# Patient Record
Sex: MALE | NOT HISPANIC OR LATINO
[De-identification: names, ages, dates, MRNs, and addresses within clinical notes are randomized per-mention and may not be internally consistent; named-entity substitution may affect disease eponyms.]

---

## 2019-01-10 ENCOUNTER — APPOINTMENT (OUTPATIENT)
Dept: ORTHOPEDIC SURGERY | Facility: CLINIC | Age: 59
End: 2019-01-10
Payer: COMMERCIAL

## 2019-01-10 VITALS
HEIGHT: 75 IN | HEART RATE: 70 BPM | SYSTOLIC BLOOD PRESSURE: 161 MMHG | BODY MASS INDEX: 31.71 KG/M2 | WEIGHT: 255 LBS | DIASTOLIC BLOOD PRESSURE: 99 MMHG

## 2019-01-10 DIAGNOSIS — Z82.61 FAMILY HISTORY OF ARTHRITIS: ICD-10-CM

## 2019-01-10 DIAGNOSIS — Z78.9 OTHER SPECIFIED HEALTH STATUS: ICD-10-CM

## 2019-01-10 DIAGNOSIS — M67.441 GANGLION, RIGHT HAND: ICD-10-CM

## 2019-01-10 DIAGNOSIS — M19.041 PRIMARY OSTEOARTHRITIS, RIGHT HAND: ICD-10-CM

## 2019-01-10 PROCEDURE — 73130 X-RAY EXAM OF HAND: CPT | Mod: RT

## 2019-01-10 PROCEDURE — 99203 OFFICE O/P NEW LOW 30 MIN: CPT

## 2019-01-10 NOTE — PHYSICAL EXAM
[de-identified] : Right hand:\par Heberden's nodes index, long, ring, little fingers.No tenderness of DIP joints.\par long finger: Thickening ulnar aspect betw nail/DIP crease. Suggestion of mucous cyst, longitudinally/obliquely oriented approx 4mm x 8 mm. full thickness skin overlying. NT\par Index: minimal thickening dorsal midline between eponychial fold and DIP crease. Nontender. No clear-cut palpable mass or mucous cyst.\par No other obvious mucous cysts.\par Otherwise, no pertinent basal thumb MP, IP joint findings.\par No A1 pulley tenderness and no triggering in any finger.\par \par Left hand\par No evidence of mucous cysts\par Little finger Heberden's node. PIP 5, flexion contracture, 15°. No pain associated. Good flexion.\par Otherwise, No pertinent MP, PIP, or DIP joint contributory findings.\par \par Neurologic: Median, ulnar, and radial motor and sensory are intact. \par Skin: No cyanosis, clubbing, or rashes.\par Vascular: Radial pulses intact.\par Lymphatic: No streaking or epitrochlear adenopathy.\par The patient is awake, alert, and oriented. Affect appropriate. Cooperative.  [de-identified] : Radiographs ordered and interpreted by me today, reviewed and discussed with the patient today.\par 3 views, right hand, including the fingers\par Osteophyte formation dorsal distal aspect of shaft at condyle as seen on lateral radiograph. Index: Volar condyle osteophyte. Long finger streaky calcification of volar and proximal to condyle.\par Ring DIP joint dorsal osteophyte at condyle margin. Small calcification volarly. Little finger dorsal ridge osteophyte distal, middle phalanx at the IP joint. Very small osteophyte extensor tubercle, distal phalanx. Soft tissue calcification dorsally. PA. Radiographs show subtle marginal, radiodensity, consistent with calcification DIP, 2, for, 5. Wrist, basal joint, MP and PIP joints without notable change except for possible slight narrowing of STT joint. No fractures or dislocations.

## 2019-01-10 NOTE — DISCUSSION/SUMMARY
[FreeTextEntry1] : The patient has mild osteoarthritis, DIP, 2, 3, 4, 5. There is a small mucous cyst, obliquely oriented, long finger along side fingernail approximately 2 x 8 mm. Skin is full thickness overlying. There is no pain associated. The mass does not require treatment. If it becomes larger, painful, developed translucent overlying skin, or drains, then surgery would be a consideration.\par \par The patient describes mucous cyst over the dorsum of the index finger, but it is not palpable today.\par No other mucous cysts appreciated bilaterally.\par Other than DIP arthritis and possibly very early left basal joint arthritis, no other notable hand problems requiring treatment.\par The possible need for surgery for long finger mucous cyst has been discussed. Surgery is not indicated at this time.\par  A lengthy and detailed discussion was held with the patient regarding analysis, treatment, and recommendations. All questions have been answered. At the conclusion the patient expressed acceptance and understanding.

## 2019-01-10 NOTE — HISTORY OF PRESENT ILLNESS
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  It is not painful.  It does not fluctuate in size.  It does not interfere with ROM.  It has not drained.  He was seen by a dermatologist Dr. Pizarro who told him to follow up here.\par He also notes a mass on his right index finger.  He had manually compressed "the cyst" by compressing eponychial region with another finger multiple times on his own. There has always been a full-thickness skin overlying. The patient did not perforate the skin with a needle. The dermatologist did not aspirate this cyst. No fluid has been expressed through the skin. The patient believes the cyst was flattened by this maneuver. It is currently very small and it is not bothering him. \par Denies numbness or tingling.  Denies triggering of any fingers.\par He notes a history of a fractured left little finger which was treated conservatively.   \par Patient has no other notable hand complaints.

## 2022-10-06 ENCOUNTER — APPOINTMENT (OUTPATIENT)
Dept: ORTHOPEDIC SURGERY | Facility: CLINIC | Age: 62
End: 2022-10-06

## 2022-10-06 DIAGNOSIS — Z00.00 ENCOUNTER FOR GENERAL ADULT MEDICAL EXAMINATION W/OUT ABNORMAL FINDINGS: ICD-10-CM

## 2022-10-06 DIAGNOSIS — M75.31 CALCIFIC TENDINITIS OF RIGHT SHOULDER: ICD-10-CM

## 2022-10-06 PROCEDURE — 73030 X-RAY EXAM OF SHOULDER: CPT | Mod: RT

## 2022-10-06 PROCEDURE — 99214 OFFICE O/P EST MOD 30 MIN: CPT

## 2022-10-06 PROCEDURE — 99204 OFFICE O/P NEW MOD 45 MIN: CPT

## 2022-10-06 RX ORDER — METHYLPREDNISOLONE 4 MG/1
4 TABLET ORAL
Qty: 1 | Refills: 0 | Status: ACTIVE | COMMUNITY
Start: 2022-10-06 | End: 1900-01-01

## 2022-10-12 NOTE — IMAGING
[de-identified] : The patient is a well appearing 62 year year old male of their stated age.\par Neck is supple & nontender to palpation. Negative Spurling's test.\par \par General: in no acute distress, seated comfortably, moving easily\par Skin: No discoloration, rashes; on palpation skin is dry, \par Neuro: Normal sensation all dermatomes, motor all myotomes\par Vascular: Normal pulses, no edema, normal temperature\par Coordination and balance: Normal\par Psych: normal mood and affect, non pressured speech, alert and oriented x3\par \par Effected Shoulder \par Inspection:\par Scapula Winging: Negative\par Deformity: None\par Erythema: None\par Ecchymosis: None\par Abrasions: None\par Effusion: None\par \par Range of Motion:\par Active Forward Flexion: 160 degrees \par Passive Forward Flexion: 170 degrees \par Active IR : L4\par Passive ER : 30 degrees\par \par Motor Exam:\par Forward Flexion: 4+ out of 5\par Flexion Plane of Scapula: 5 out of 5\par Abduction: 4+ out of 5\par Internal Rotation: 5 out of 5\par External Rotation: 4+ out of 5\par Distal Motor Strength: 5 out of 5\par \par Stability Testing:\par Anterior: 1+\par Posterior: 1+\par Sulcus N: 1+\par Sulcus ER: 1+\par \par Provocative Tests:\par Drop Arm: Negative\par Lawson/Impingement: Positive\par [place]: Positive\par X-Arm Adduction: Negative\par Belly Press: Negative\par Bear Hug: Negative\par Lift Off: Negative\par Apprehension: Negative\par Relocation: Negative\par Posterior Load & Shift: Negative\par \par Palpation:\par AC Joint: Nontender\par Clavicle: Nontender\par SC Joint: Nontender\par Bicepital Groove: Positive\par Coracoid Process: Nontender\par Pectoralis Minor Tendon: Nontender\par Pectoralis Major Tendon: Nontender & palpably intact\par Latissimus Dorsi: Nontender \par Proximal Humerus: Positive\par Scapula Body: Nontender\par Medial Scapula Boarder: Nontender\par Scapula Spine: Nontender\par \par Neurologic Exam: Sensation to Light Touch:\par Axillary: Grossly intact\par Ulnar: Grossly intact\par Radial: Grossly intact\par Median: Grossly intact\par Other:  N/A\par \par Circulatory/Pulses:\par Ulnar: 2+\par Radial: 2+\par Other Pertinent Findings: None\par \par Contralateral Shoulder\par Range of Motion:\par Active Forward Flexion: 180 degrees \par Active Abduction: 180 degrees \par Passive Forward Flexion: 180 degrees \par Passive Abduction: 180 degrees \par ER @ 90 degrees: 90 degrees\par IR @ 90 degrees: 45 degrees\par ER @ 0 degrees: 50 degrees\par \par Motor Exam:\par Forward Flexion: 5 out of 5\par Flexion Plane of Scapula: 5 out of 5\par Abduction: 5 out of 5\par Internal Rotation: 5 out of 5\par External Rotation: 5 out of 5\par Distal Motor Strength: 5 out of 5\par \par Stability Testing:\par Anterior: 1+\par Posterior: 1+\par Sulcus N: 1+\par Sulcus ER: 1+\par \par Other Pertinent Findings: None\par   [Right] : right shoulder [There are no fractures, subluxations or dislocations. No significant abnormalities are seen] : There are no fractures, subluxations or dislocations. No significant abnormalities are seen [Type 2 acromion] : Type 2 acromion [Calcific density] : Calcific density

## 2022-10-12 NOTE — HISTORY OF PRESENT ILLNESS
[de-identified] : The patient is a 61 year old RT hand dominant M who presents today complaining of right shoulder pain\par Date of Injury/Onset: 2021 \par Pain:    At Rest: 4/10 \par With Activity:  4/10 \par Mechanism of injury: No known cause of injury/ trauma \par This is [not] a Work Related Injury being treated under Worker's Compensation.\par This is [not] an athletic injury occurring associated with an interscholastic or organized sports team.\par Quality of symptoms: Aching \par Improves with: Motrin \par Worse with: Lifting \par Treatment/ Imaging/ Studies sine last visit: PT 3/222\par              Reports Available For Review Today:\par Out of work/sport: [Yes], since [date of injury]\par School/Sport/Position/Occupation:  \par Additional Information: [None]\par  \par 10/06/2022: Patient is here today for a followup visit for R shoulder pain, previously seen, has done PT, no h/o CSI. Pain started in 2021, no specific trauma/injury, denies n/t to RT hand. Has taken ibuprofen with mild relief.

## 2022-10-12 NOTE — DISCUSSION/SUMMARY
[de-identified] : Assessment: The patient is a 61 year old male with RT shoulder pain and physical exam findings consistent with calcific tendonitis.\par \par Patient and I discussed their symptoms. Discussed findings of today's exam and possible causes of patient's pain. Educated patient on their most probable diagnosis. Reviewed possible courses of treatment, and we collaboratively decided best course of treatment at this time will include:\par \par 1. MRI RT shoulder\par 2. Start MDP \par 3. Consider CSI RT shoulder if pain persists or worsens\par 4. HEP\par \par The patient's current medication management of their orthopedic diagnosis was reviewed today: \par \par (1) We discussed a comprehensive treatment plan that included possible pharmaceutical management involving the use of prescription strength medications including but not limited to options such as oral Naprosyn 500mg BID, once daily Meloxicam 15 mg, or 500-650 mg Tylenol versus over the counter oral medications and topical prescription NSAID Pennsaid vs over the counter Voltaren gel. \par \par (2) There is a moderate risk of morbidity with further treatment, especially from use of prescription strength medications and possible side effects of these medications which include upset stomach with oral medications, skin reactions to topical medications and cardiac/renal issues with long term use. \par \par (3) I recommended that the patient follow-up with their medical physician to discuss any significant specific potential issues with long term medication use such as interactions with current medications or with exacerbation of underlying medical comorbidities. \par \par (4) The benefits and risks associated with use of injectable, oral or topical, prescription and over the counter anti-inflammatory medications were discussed with the patient. The patient voiced understanding of the risks including but not limited to bleeding, stroke, kidney dysfunction, heart disease, and were referred to the black box warning label for further information.\par \par Follow up after MRI. \par \par History, physical exam, imaging, assessment and plan documented by Mert Watson. The documentation recorded by the scribe accurately reflects the service I, Nhan Gutierrez MD, personally performed and the decisions made by me.

## 2022-10-17 ENCOUNTER — RESULT REVIEW (OUTPATIENT)
Age: 62
End: 2022-10-17

## 2022-11-09 ENCOUNTER — APPOINTMENT (OUTPATIENT)
Dept: ORTHOPEDIC SURGERY | Facility: CLINIC | Age: 62
End: 2022-11-09

## 2022-11-09 PROCEDURE — 99443: CPT
